# Patient Record
Sex: FEMALE | Race: WHITE | Employment: OTHER | URBAN - METROPOLITAN AREA
[De-identification: names, ages, dates, MRNs, and addresses within clinical notes are randomized per-mention and may not be internally consistent; named-entity substitution may affect disease eponyms.]

---

## 2022-01-10 ENCOUNTER — HOSPITAL ENCOUNTER (OUTPATIENT)
Dept: WOUND CARE | Age: 72
Discharge: HOME | End: 2022-01-10
Payer: MEDICARE

## 2022-01-10 ENCOUNTER — TELEPHONE (OUTPATIENT)
Dept: WOUND CARE | Age: 72
End: 2022-01-10

## 2022-01-10 VITALS
HEART RATE: 90 BPM | RESPIRATION RATE: 18 BRPM | WEIGHT: 210.54 LBS | DIASTOLIC BLOOD PRESSURE: 103 MMHG | HEIGHT: 60 IN | BODY MASS INDEX: 41.33 KG/M2 | SYSTOLIC BLOOD PRESSURE: 154 MMHG | TEMPERATURE: 96.5 F

## 2022-01-10 DIAGNOSIS — T81.89XA NON-HEALING SURGICAL WOUND, INITIAL ENCOUNTER: Primary | ICD-10-CM

## 2022-01-10 PROCEDURE — 87070 CULTURE OTHR SPECIMN AEROBIC: CPT

## 2022-01-10 PROCEDURE — 87186 SC STD MICRODIL/AGAR DIL: CPT

## 2022-01-10 PROCEDURE — 87176 TISSUE HOMOGENIZATION CULTR: CPT

## 2022-01-10 PROCEDURE — 99202 OFFICE O/P NEW SF 15 MIN: CPT | Performed by: SPECIALIST

## 2022-01-10 PROCEDURE — 11042 DBRDMT SUBQ TIS 1ST 20SQCM/<: CPT | Performed by: SPECIALIST

## 2022-01-10 PROCEDURE — 87205 SMEAR GRAM STAIN: CPT

## 2022-01-10 PROCEDURE — 99203 OFFICE O/P NEW LOW 30 MIN: CPT

## 2022-01-10 PROCEDURE — 11042 DBRDMT SUBQ TIS 1ST 20SQCM/<: CPT

## 2022-01-10 PROCEDURE — 87077 CULTURE AEROBIC IDENTIFY: CPT

## 2022-01-10 PROCEDURE — 6370000000 HC RX 637 (ALT 250 FOR IP): Performed by: SPECIALIST

## 2022-01-10 PROCEDURE — 99213 OFFICE O/P EST LOW 20 MIN: CPT

## 2022-01-10 RX ORDER — MOMETASONE FUROATE 1 MG/G
1 OINTMENT TOPICAL PRN
COMMUNITY
Start: 2021-10-08 | End: 2022-10-08

## 2022-01-10 RX ORDER — SODIUM PHOSPHATE,MONO-DIBASIC 19G-7G/118
1 ENEMA (ML) RECTAL DAILY
COMMUNITY

## 2022-01-10 RX ORDER — CEPHALEXIN 500 MG/1
1 TABLET ORAL 4 TIMES DAILY
COMMUNITY
Start: 2022-01-03 | End: 2022-01-17

## 2022-01-10 RX ORDER — SERTRALINE HYDROCHLORIDE 100 MG/1
1 TABLET, FILM COATED ORAL NIGHTLY
COMMUNITY
Start: 2021-12-02

## 2022-01-10 RX ORDER — TRIAMCINOLONE ACETONIDE 5 MG/G
1 CREAM TOPICAL PRN
COMMUNITY
Start: 2021-11-06

## 2022-01-10 RX ORDER — VALACYCLOVIR HYDROCHLORIDE 1 G/1
1000 TABLET, FILM COATED ORAL 3 TIMES DAILY
COMMUNITY
Start: 2022-01-06 | End: 2022-01-17 | Stop reason: ALTCHOICE

## 2022-01-10 RX ORDER — BENZONATATE 100 MG/1
1 CAPSULE ORAL PRN
COMMUNITY
Start: 2022-01-05

## 2022-01-10 RX ORDER — LIDOCAINE HYDROCHLORIDE 40 MG/ML
2.5 SOLUTION TOPICAL ONCE
Status: COMPLETED | OUTPATIENT
Start: 2022-01-10 | End: 2022-01-10

## 2022-01-10 RX ORDER — LOSARTAN POTASSIUM 100 MG/1
1 TABLET ORAL DAILY
COMMUNITY
Start: 2021-12-02

## 2022-01-10 RX ORDER — TOLTERODINE 4 MG/1
1 CAPSULE, EXTENDED RELEASE ORAL DAILY
COMMUNITY
Start: 2021-12-03

## 2022-01-10 RX ORDER — CETIRIZINE HYDROCHLORIDE 10 MG/1
10 TABLET ORAL DAILY
COMMUNITY

## 2022-01-10 RX ORDER — ATORVASTATIN CALCIUM 20 MG/1
20 TABLET, FILM COATED ORAL DAILY
COMMUNITY
Start: 2022-01-05

## 2022-01-10 RX ORDER — ASCORBIC ACID 500 MG
500 TABLET ORAL DAILY
COMMUNITY

## 2022-01-10 RX ORDER — IBUPROFEN 200 MG
1 CAPSULE ORAL DAILY
COMMUNITY

## 2022-01-10 RX ORDER — GABAPENTIN 300 MG/1
1 CAPSULE ORAL EVERY EVENING
COMMUNITY
Start: 2022-01-06

## 2022-01-10 RX ADMIN — LIDOCAINE HYDROCHLORIDE 2.5 ML: 40 SOLUTION TOPICAL at 10:29

## 2022-01-10 ASSESSMENT — PAIN DESCRIPTION - FREQUENCY: FREQUENCY: INTERMITTENT

## 2022-01-10 ASSESSMENT — PAIN DESCRIPTION - PAIN TYPE: TYPE: ACUTE PAIN

## 2022-01-10 ASSESSMENT — PAIN DESCRIPTION - DESCRIPTORS: DESCRIPTORS: DULL

## 2022-01-10 ASSESSMENT — PAIN DESCRIPTION - ONSET: ONSET: ON-GOING

## 2022-01-10 ASSESSMENT — PAIN DESCRIPTION - PROGRESSION: CLINICAL_PROGRESSION: NOT CHANGED

## 2022-01-10 ASSESSMENT — PAIN SCALES - GENERAL: PAINLEVEL_OUTOF10: 2

## 2022-01-10 ASSESSMENT — PAIN - FUNCTIONAL ASSESSMENT: PAIN_FUNCTIONAL_ASSESSMENT: PREVENTS OR INTERFERES SOME ACTIVE ACTIVITIES AND ADLS

## 2022-01-10 ASSESSMENT — PAIN DESCRIPTION - ORIENTATION: ORIENTATION: LEFT

## 2022-01-10 ASSESSMENT — PAIN DESCRIPTION - LOCATION: LOCATION: KNEE

## 2022-01-10 NOTE — PROGRESS NOTES
[facility] [place]  [place] Note and Procedure Note      Krishna Blevins  MEDICAL RECORD NUMBER:  [id number]  AGE: 73 y.o. GENDER: female  : 1950  EPISODE DATE:  1/10/2022      Subjective:     Chief Complaint   Patient presents with    Wound Check     left knee         HISTORY of PRESENT ILLNESS HPI     Krishna Blevins is a 73 y.o. female who presents today for wound evaluation. History of Wound Context: This patient was referred from Dr. Millicent Zheng office for a nonhealing total knee incision having been performed in November of this year. Apparently for some time the patient has noticed portion of the distal wound remained open with occasional drainage. Currently she has been seen several times by the physicians assistant orthopedic office and she has been placed on Keflex with really no improvement in the appearance of the wound. States she is otherwise recuperating from surgery well continues with rehab on her knee. 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 No pertinent family history.     SOCIAL HISTORY    Social History     Tobacco Use    Smoking status: Never Smoker    Smokeless tobacco: Never Used   Vaping Use    Vaping Use: Never used   Substance Use Topics    Alcohol use: Yes     Comment: 1-2 TIMES A YEAR;     Drug use: Never       ALLERGIES    Allergies   Allergen Reactions    Latex Rash     Okay to have flu shot       Nickel      Rash      Sulfa Antibiotics      Rash         MEDICATIONS    Current Outpatient Medications on File Prior to Encounter   Medication Sig Dispense Refill    atorvastatin (LIPITOR) 20 MG tablet Take 20 mg by mouth daily      mometasone (ELOCON) 0.1 % ointment Apply 1 Dose topically as needed      valACYclovir (VALTREX) 1 g tablet Take 1,000 mg by mouth 3 times daily      ascorbic acid (VITAMIN C) 500 MG tablet Take 500 mg by mouth daily 1000 mg      benzonatate (TESSALON) 100 MG capsule Take 1 capsule by mouth as needed      calcium carbonate (OYSTER SHELL CALCIUM 500 MG) 1250 (500 Ca) MG tablet Take 1 tablet by mouth daily      Cephalexin 500 MG TABS Take 1 tablet by mouth 4 times daily      cetirizine (ZYRTEC) 10 MG tablet Take 10 mg by mouth daily      gabapentin (NEURONTIN) 300 MG capsule Take 1 capsule by mouth every evening.  glucosamine-chondroitin 500-400 MG CAPS Take 1 capsule by mouth daily      losartan (COZAAR) 100 MG tablet Take 1 tablet by mouth daily      sertraline (ZOLOFT) 100 MG tablet Take 1 tablet by mouth nightly      tolterodine (DETROL LA) 4 MG extended release capsule Take 1 capsule by mouth daily      triamcinolone (ARISTOCORT) 0.5 % cream Apply 1 Dose topically as needed      MAGNESIUM ASPARTATE PO Take 1 tablet by mouth daily      VITAMIN D, ERGOCALCIFEROL, PO Take 5,000 Units by mouth daily       No current facility-administered medications on file prior to encounter.        REVIEW OF SYSTEMS    Constitutional: negative for chills and fevers  Respiratory: negative for shortness of breath  Cardiovascular: negative for chest pain  Gastrointestinal: negative for abdominal pain  Musculoskeletal:negative except for muscle weakness      Last B9Q if applicable: No results found for: LABA1C    Objective:      BP (!) 154/103   Pulse 90   Temp 96.5 °F (35.8 °C) (Temporal)   Resp 18   Ht 5' (1.524 m)   Wt 210 lb 8.6 oz (95.5 kg)   BMI 41.12 kg/m²     Wt Readings from Last 3 Encounters:   01/10/22 210 lb 8.6 oz (95.5 kg)       PHYSICAL EXAM    General Appearance: in no acute distress and obese  Head: normocephalic and atraumatic  Pulmonary/Chest: clear to auscultation bilaterally- no wheezes, rales or rhonchi, normal air movement, no respiratory distress  Cardiovascular: normal rate, normal S1 and S2, no gallops, intact distal pulses and no carotid bruits  Abdomen: soft, non-tender, non-distended, normal bowel sounds, no masses or organomegaly  Extremities: no cyanosis, clubbing or edema with full-thickness wound distal portion of recent left total knee replacement incision containing fibrin, slough, necrotic fat. Wound does not appear to tract to prosthetic. Mild periwound erythema          Assessment:     No diagnosis found. Procedure Note  Indications:  Based on my examination of this patient's wound(s) today, sharp excision is required to promote healing and evaluate the extent healing. Performed by: Adam Modi MD    Consent obtained: Yes    Time out taken:  Yes    Pain Control: Anesthetic  Anesthetic: 4% Lidocaine Liquid Topical       Debridement:Excisional Debridement    Using curette the wound(s) was/were sharply debrided down through and including the removal of epidermis, dermis and subcutaneous tissue.         Devitalized Tissue Debrided:  fibrin and slough    Pre Debridement Measurements:  Are located in the Wound Documentation Flow Sheet    Wound #: 1     Post  Debridement Measurements:  Wound 01/10/22 Knee #1 LEFT KNEE (Active)   Wound Image   01/10/22 1020   Wound Etiology Non-Healing Surgical 01/10/22 1020   Wound Cleansed Cleansed with saline 01/10/22 1020   Wound Length (cm) 2 cm 01/10/22 1020   Wound Width (cm) 1 cm 01/10/22 1020   Wound Depth (cm) 0.6 cm 01/10/22 1020   Wound Surface Area (cm^2) 2 cm^2 01/10/22 1020   Wound Volume (cm^3) 1.2 cm^3 01/10/22 1020   Post-Procedure Length (cm) 2.1 cm 01/10/22 1037   Post-Procedure Width (cm) 1.1 cm 01/10/22 1037   Post-Procedure Depth (cm) 0.8 cm 01/10/22 1037   Post-Procedure Surface Area (cm^2) 2.31 cm^2 01/10/22 1037   Post-Procedure Volume (cm^3) 1.848 cm^3 01/10/22 1037   Distance Tunneling (cm) 0 cm 01/10/22 1020   Undermining Maxium Distance (cm) 0 01/10/22 1020   Wound Assessment Eschar moist;Slough 01/10/22 1020   Drainage Amount Small 01/10/22 1020   Drainage Description Yellow 01/10/22 1020   Odor None 01/10/22 1020   Roselyn-wound Assessment Intact;Edematous 01/10/22 1020   Margins Defined edges 01/10/22 1020   Wound Thickness Description not for Pressure Injury Full thickness 01/10/22 1020   Number of days: 0          Percent of Wound Debrided: 100%    Total Surface Area Debrided:  2.3 sq cm     Bleeding:  Minimal    Hemostasis Achieved:  by pressure    Procedural Pain:  2  / 10     Post Procedural Pain:  0 / 10     Response to treatment:  With complaints of pain. Plan:   The patient was instructed on dressing changes packing the wound with Medihoney strips. A tissue culture was obtained today from this wound. Treatment Note please see attached Discharge Instructions    New Medication(s) at this visit:   New Prescriptions    No medications on file       Other orders at this visit:   Orders Placed This Encounter   Procedures    Culture, Tissue       Weight Management: No. N/A    Smoking Cessation: Counseling given: Not Answered        Discharge Instructions          [facility] Physician Orders and Discharge Instructions  [address]  Telephone: [phone]  NAME:  Alba Sloan OF BIRTH:  1950  MEDICAL RECORD NUMBER:  [id number]  DATE:  1/10/2022    Wash hands with soap and water prior to and after every dressing change. Wound Cleansing:   · Do not scrub or use excessive force.   · With each dressing change, rinse wounds with 0.9% Saline. (May use wound wash or soft contact solution. Both can be purchased at a local drug store). · If unable to obtain saline, may use a gentle soap and water. · Keep wounds dry in the shower unless otherwise instructed by the physician. · For wounds on lower legs, cast covers can be purchased at local drug stores, so that you may shower and keep the wound(s) dry. []  Vashe Wash solution instructions (if prescribed): Apply enough Vashe to soak a piece of gauze and place on wound bed for 5-10 minutes. DO NOT rinse after Vashe has been applied. Follow dressing application as instructed below. Roselyn wound Topical Treatments:  Do not apply lotions, creams, or ointments to the skin around the wound bed unless directed as followed:     [] Apply around the wound: [] moisturizing lotion [] Antifungal ointment [] No-Sting barrier film [] Zinc paste [] Other:       Dressings:           Wound Location: left knee      Apply Primary Dressing to wound: Other: plain packing strip impregnated with medihoney- pack loosely     Cover and Secure with:     Cover and Secure with: 4X4 gauze pad - paper tape   Avoid contact of tape with skin if possible.  When to change Dressing: [] Daily [] Every Other Day [] Once a week  [x] Three times per week: [x] Monday, Wednesday, Friday [] Tuesday, Thursday, Saturday  [] Do Not Change Dressing [] Other:       Edema Control:      [x] Elevate leg(s) above the level of the heart for 30 minutes 4-5 times a day and/or when sitting. [x] Avoid prolonged standing in one place. Dietary:  Important dietary reminders:  1. Increase Protein intake (i.e. Lean meats, fish, eggs, legumes, and yogurt)  2. No added salt  3. If diabetic, follow a diabetic diet and check glucose prior to meals or as instructed by your physician.     Dietary Supplements:  [] Huber  [] 30ml ProStat  [] EnsureEnlive [] Ensure Max/Premier  [] Other:    If you are still having pain after you go

## 2022-01-10 NOTE — TELEPHONE ENCOUNTER
Stevo [address] [phone] and spoke to [employer]. Requesting medical records on patient. Osco stated letter requesting release of records needs to be faxed. Letter faxed to Parisa at [phone].

## 2022-01-10 NOTE — LETTER
Release of Medical Records  [address] [place] [address]  [address] [place] [address] Road .[phone]  Fax: [phone] [facility] RECORD NUMBER: [id number]   AGE: 73 y.o. GENDER: female : 1950   TODAYS DATE: 1/10/2022       Ms. Raman was seen in the [address] [place] [address] Road on 1/10/2022     Requesting medical records from [facility]. Patient had left knee surgery by Dr. Mirian Rebolledo in 2021. Please fax over records.      Electronically signed by Concepcion Shah RN on 1/10/2022 at 11:59 AM    Sincerely,      Dr. Julita Westbrook and [facility]

## 2022-01-11 ENCOUNTER — TELEPHONE (OUTPATIENT)
Dept: WOUND CARE | Age: 72
End: 2022-01-11

## 2022-01-11 PROBLEM — T81.89XA NONHEALING SURGICAL WOUND: Status: ACTIVE | Noted: 2022-01-11

## 2022-01-11 RX ORDER — LIDOCAINE 50 MG/G
OINTMENT TOPICAL ONCE
Status: CANCELLED | OUTPATIENT
Start: 2022-01-11 | End: 2022-01-11

## 2022-01-11 RX ORDER — LIDOCAINE HYDROCHLORIDE 20 MG/ML
JELLY TOPICAL ONCE
Status: CANCELLED | OUTPATIENT
Start: 2022-01-11 | End: 2022-01-11

## 2022-01-11 RX ORDER — BETAMETHASONE DIPROPIONATE 0.05 %
OINTMENT (GRAM) TOPICAL ONCE
Status: CANCELLED | OUTPATIENT
Start: 2022-01-11 | End: 2022-01-11

## 2022-01-11 RX ORDER — LIDOCAINE HYDROCHLORIDE 40 MG/ML
SOLUTION TOPICAL ONCE
Status: CANCELLED | OUTPATIENT
Start: 2022-01-11 | End: 2022-01-11

## 2022-01-11 RX ORDER — BACITRACIN ZINC AND POLYMYXIN B SULFATE 500; 1000 [USP'U]/G; [USP'U]/G
OINTMENT TOPICAL ONCE
Status: CANCELLED | OUTPATIENT
Start: 2022-01-11 | End: 2022-01-11

## 2022-01-11 RX ORDER — GINSENG 100 MG
CAPSULE ORAL ONCE
Status: CANCELLED | OUTPATIENT
Start: 2022-01-11 | End: 2022-01-11

## 2022-01-11 RX ORDER — BACITRACIN, NEOMYCIN, POLYMYXIN B 400; 3.5; 5 [USP'U]/G; MG/G; [USP'U]/G
OINTMENT TOPICAL ONCE
Status: CANCELLED | OUTPATIENT
Start: 2022-01-11 | End: 2022-01-11

## 2022-01-11 RX ORDER — GENTAMICIN SULFATE 1 MG/G
OINTMENT TOPICAL ONCE
Status: CANCELLED | OUTPATIENT
Start: 2022-01-11 | End: 2022-01-11

## 2022-01-11 RX ORDER — LIDOCAINE 40 MG/G
CREAM TOPICAL ONCE
Status: CANCELLED | OUTPATIENT
Start: 2022-01-11 | End: 2022-01-11

## 2022-01-11 RX ORDER — CLOBETASOL PROPIONATE 0.5 MG/G
OINTMENT TOPICAL ONCE
Status: CANCELLED | OUTPATIENT
Start: 2022-01-11 | End: 2022-01-11

## 2022-01-11 NOTE — TELEPHONE ENCOUNTER
Patient called and stated she has not yet received wound care supplies and questioned if she should be doing her physical therapy for her left knee since she has a wound. Instructed patient to continue physical therapy because of resent surgery to left knee and we will further discuss with wound care visit next week. Will resend fax to [facility]. regarding wound care supplies.

## 2022-01-15 LAB
ANAEROBIC CULTURE: ABNORMAL
GRAM STAIN RESULT: ABNORMAL
ORGANISM: ABNORMAL
WOUND/ABSCESS: ABNORMAL

## 2022-01-17 ENCOUNTER — HOSPITAL ENCOUNTER (OUTPATIENT)
Dept: WOUND CARE | Age: 72
Discharge: HOME | End: 2022-01-17
Payer: MEDICARE

## 2022-01-17 VITALS
RESPIRATION RATE: 20 BRPM | SYSTOLIC BLOOD PRESSURE: 125 MMHG | TEMPERATURE: 96.9 F | DIASTOLIC BLOOD PRESSURE: 84 MMHG | HEART RATE: 69 BPM

## 2022-01-17 DIAGNOSIS — T81.89XA NON-HEALING SURGICAL WOUND, INITIAL ENCOUNTER: Primary | ICD-10-CM

## 2022-01-17 PROCEDURE — 11042 DBRDMT SUBQ TIS 1ST 20SQCM/<: CPT | Performed by: SPECIALIST

## 2022-01-17 PROCEDURE — 6370000000 HC RX 637 (ALT 250 FOR IP): Performed by: SPECIALIST

## 2022-01-17 PROCEDURE — 11042 DBRDMT SUBQ TIS 1ST 20SQCM/<: CPT

## 2022-01-17 RX ORDER — LIDOCAINE 50 MG/G
OINTMENT TOPICAL ONCE
Status: CANCELLED | OUTPATIENT
Start: 2022-01-17 | End: 2022-01-17

## 2022-01-17 RX ORDER — LIDOCAINE 40 MG/G
CREAM TOPICAL ONCE
Status: CANCELLED | OUTPATIENT
Start: 2022-01-17 | End: 2022-01-17

## 2022-01-17 RX ORDER — CIPROFLOXACIN 500 MG/1
500 TABLET, FILM COATED ORAL 2 TIMES DAILY
Qty: 20 TABLET | Refills: 0 | Status: SHIPPED | OUTPATIENT
Start: 2022-01-17 | End: 2022-01-27

## 2022-01-17 RX ORDER — LIDOCAINE HYDROCHLORIDE 40 MG/ML
SOLUTION TOPICAL ONCE
Status: CANCELLED | OUTPATIENT
Start: 2022-01-17 | End: 2022-01-17

## 2022-01-17 RX ORDER — LIDOCAINE HYDROCHLORIDE 20 MG/ML
JELLY TOPICAL ONCE
Status: CANCELLED | OUTPATIENT
Start: 2022-01-17 | End: 2022-01-17

## 2022-01-17 RX ORDER — LIDOCAINE HYDROCHLORIDE 40 MG/ML
SOLUTION TOPICAL ONCE
Status: COMPLETED | OUTPATIENT
Start: 2022-01-17 | End: 2022-01-17

## 2022-01-17 RX ORDER — CLOBETASOL PROPIONATE 0.5 MG/G
OINTMENT TOPICAL ONCE
Status: CANCELLED | OUTPATIENT
Start: 2022-01-17 | End: 2022-01-17

## 2022-01-17 RX ORDER — BACITRACIN ZINC AND POLYMYXIN B SULFATE 500; 1000 [USP'U]/G; [USP'U]/G
OINTMENT TOPICAL ONCE
Status: CANCELLED | OUTPATIENT
Start: 2022-01-17 | End: 2022-01-17

## 2022-01-17 RX ORDER — CIPROFLOXACIN 500 MG/1
500 TABLET, FILM COATED ORAL 2 TIMES DAILY
COMMUNITY
End: 2022-01-24

## 2022-01-17 RX ORDER — BETAMETHASONE DIPROPIONATE 0.05 %
OINTMENT (GRAM) TOPICAL ONCE
Status: CANCELLED | OUTPATIENT
Start: 2022-01-17 | End: 2022-01-17

## 2022-01-17 RX ORDER — BACITRACIN, NEOMYCIN, POLYMYXIN B 400; 3.5; 5 [USP'U]/G; MG/G; [USP'U]/G
OINTMENT TOPICAL ONCE
Status: CANCELLED | OUTPATIENT
Start: 2022-01-17 | End: 2022-01-17

## 2022-01-17 RX ORDER — GENTAMICIN SULFATE 1 MG/G
OINTMENT TOPICAL ONCE
Status: CANCELLED | OUTPATIENT
Start: 2022-01-17 | End: 2022-01-17

## 2022-01-17 RX ORDER — GINSENG 100 MG
CAPSULE ORAL ONCE
Status: CANCELLED | OUTPATIENT
Start: 2022-01-17 | End: 2022-01-17

## 2022-01-17 RX ADMIN — LIDOCAINE HYDROCHLORIDE: 40 SOLUTION TOPICAL at 10:14

## 2022-01-17 ASSESSMENT — PAIN DESCRIPTION - PAIN TYPE: TYPE: ACUTE PAIN

## 2022-01-17 ASSESSMENT — PAIN DESCRIPTION - DESCRIPTORS: DESCRIPTORS: OTHER (COMMENT)

## 2022-01-17 ASSESSMENT — PAIN DESCRIPTION - ORIENTATION: ORIENTATION: LEFT

## 2022-01-17 ASSESSMENT — PAIN DESCRIPTION - LOCATION: LOCATION: KNEE

## 2022-01-17 ASSESSMENT — PAIN DESCRIPTION - ONSET: ONSET: ON-GOING

## 2022-01-17 ASSESSMENT — PAIN DESCRIPTION - FREQUENCY: FREQUENCY: INTERMITTENT

## 2022-01-17 ASSESSMENT — PAIN SCALES - GENERAL: PAINLEVEL_OUTOF10: 5

## 2022-01-17 NOTE — PROGRESS NOTES
Yes    Time out taken: Yes    Pain Control: Anesthetic: 4% Lidocaine Liquid Topical     Debridement:Excisional Debridement    Using curette the wound was sharply debrided    down through and including the removal of  epidermis, dermis and subcutaneous tissue.     Devitalized Tissue Debrided:  fibrin and slough      Pre Debridement Measurements:  Are located in the Wound Documentation Flow Sheet   Wound #: 1     Post  Debridement Measurements:  Wound 01/10/22 Knee #1 LEFT KNEE (Active)   Wound Image   01/10/22 1020   Wound Etiology Non-Healing Surgical 01/10/22 1020   Wound Cleansed Cleansed with saline 01/17/22 1003   Dressing/Treatment Other (comment) 01/17/22 1028   Wound Length (cm) 2 cm 01/17/22 1003   Wound Width (cm) 1 cm 01/17/22 1003   Wound Depth (cm) 0.6 cm 01/17/22 1003   Wound Surface Area (cm^2) 2 cm^2 01/17/22 1003   Change in Wound Size % (l*w) 0 01/17/22 1003   Wound Volume (cm^3) 1.2 cm^3 01/17/22 1003   Wound Healing % 0 01/17/22 1003   Post-Procedure Length (cm) 2.1 cm 01/17/22 1028   Post-Procedure Width (cm) 1.1 cm 01/17/22 1028   Post-Procedure Depth (cm) 0.8 cm 01/17/22 1028   Post-Procedure Surface Area (cm^2) 2.31 cm^2 01/17/22 1028   Post-Procedure Volume (cm^3) 1.848 cm^3 01/17/22 1028   Distance Tunneling (cm) 0 cm 01/17/22 1003   Undermining Starts ___ O'Clock 11:00 01/17/22 1003   Undermining Ends___ O'Clock 1:00 01/17/22 1003   Undermining Maxium Distance (cm) 0.2 01/17/22 1003   Wound Assessment Slough 01/17/22 1003   Drainage Amount Small 01/17/22 1003   Drainage Description Brown 01/17/22 1003   Odor None 01/17/22 1003   Roselyn-wound Assessment Intact;Edematous 01/17/22 1003   Margins Defined edges 01/17/22 1003   Wound Thickness Description not for Pressure Injury Full thickness 01/17/22 1003   Number of days: 7          Percent of Wound Debrided: 100%    Total Surface Area Debrided:  2.3 sq cm    Diabetic/Pressure/Non Pressure Ulcers only:  Ulcer: Non-Pressure ulcer, fat layer exposed    Bleeding: Minimal    Hemostasis Achieved: by pressure    Procedural Pain: 0  / 10     Post Procedural Pain: 0 / 10     Response to treatment:  Well tolerated by patient. I have asked patient to discontinue Keflex antibiotics as prescribed by Dr. Hailee PRYOR. Tissue culture done last week shows more efficacy with putting patient on a course of Cipro oral antibiotics. Plan:     Treatment Note: Please see attached Discharge Instructions. These instructions were given and signed by the patient or POA    New Medication(s) at this visit:   New Prescriptions    CIPROFLOXACIN (CIPRO) 500 MG TABLET    Take 1 tablet by mouth 2 times daily for 10 days       Other orders at this visit:   Orders Placed This Encounter   Procedures   [id number] [facility] Protocol       Discharge Instructions          [facility] Physician Orders and Discharge Instructions  [address] [place] [address] [address] [place] [address]. [place] [address]  Telephone: [phone]  NAME:  Navjot Aparicio OF BIRTH:  1950  MEDICAL RECORD NUMBER:  [id number]  DATE:  1/17/2022    Wash hands with soap and water prior to and after every dressing change. Wound Cleansing:   · Do not scrub or use excessive force. · With each dressing change, rinse wounds with 0.9% Saline. (May use wound wash or soft contact solution. Both can be purchased at a local drug store). · If unable to obtain saline, may use a gentle soap and water. · Keep wounds dry in the shower unless otherwise instructed by the physician. · For wounds on lower legs, cast covers can be purchased at local drug stores, so that you may shower and keep the wound(s) dry. []  Vashe Wash solution instructions (if prescribed): Apply enough Vashe to soak a piece of gauze and place on wound bed for 5-10 minutes. DO NOT rinse after Vashe has been applied. Follow dressing application as instructed below.     Roselyn wound Topical Treatments:  Do not apply lotions, creams, or ointments to the skin around the wound bed unless directed as followed:     [] Apply around the wound: [] moisturizing lotion [] Antifungal ointment [] No-Sting barrier film [] Zinc paste [] Other:       Dressings:           Wound Location: left knee      Apply Primary Dressing to wound:       Plain packing  coated with medihoney- pack loosely     Cover and Secure with:     Cover and Secure with: 4X4 gauze pad  Bulky roll gauze   Avoid contact of tape with skin if possible.  When to change Dressing: [] Daily [] Every Other Day [] Once a week  [x] Three times per week: [x] Monday, Wednesday, Friday [] Tuesday, Thursday, Saturday  [] Do Not Change Dressing [] Other:       Edema Control:  Apply: [] Compression Stocking  [] Left Lower Leg [] Right Lower Leg     [x]  Spandagrip:Strength: Low compression 5-10 mm/Hg - up to thigh (use size F)   [x] Left Lower Leg  [] Right Lower Leg        Apply every morning immediately when getting up. They should be applied to affected leg(s) from mid foot to knee making sure to cover the heel. Remove every night before going to bed. [x] Elevate leg(s) above the level of the heart for 30 minutes 4-5 times a day and/or when sitting. [x] Avoid prolonged standing in one place. Dietary:  Important dietary reminders:  1. Increase Protein intake (i.e. Lean meats, fish, eggs, legumes, and yogurt)  2. No added salt  3. If diabetic, follow a diabetic diet and check glucose prior to meals or as instructed by your physician. Dietary Supplements:  [] Huber  [] 30ml ProStat  [] EnsureEnlive [] Ensure Max/Premier  [] Other:    If you are still having pain after you go home:   For wounds on lower legs or arms, elevate the affected limb.  Use over-the-counter medications you would normally use for pain as permitted by your primary care doctor.    For persistent pain not relieved by the above interventions, please call your primary care doctor. Return Appointment:   Return Appointment: With Dr. Trevor Bustillo  in  [facility])    [] Return Appointment for a Wound Assessment (Nurse Visit) on:       [x] Orders placed during your visit:   Orders Placed This Encounter   Procedures    Initiate Outpatient Wound Care Protocol       o All other future appointments:  - No future appointments. Your nurse  is:  Per Ruiz     Electronically signed by Nick Barrow RN on 1/17/2022 at 10:30 R Rocco Ocampo 8 Information: Should you experience any significant changes in your wound(s) or have questions about your wound care, please contact the [address] [place] [address] at [phone]. We are open from 8:00am - 4:30p Monday thru Friday except for Wednesdays which we are closed. Please give us 24-48 hours to return your call. Call your doctor now or seek immediate medical care if:    · You have symptoms of infection, such as:  ? Increased pain, swelling, warmth, or redness. ? Red streaks leading from the area. ? Pus draining from the area. ? A fever.         Physician for this visit and orders: Jourdan Brooks MD    [] Patient unable to sign Discharge Instructions given to ECF/Transportation/POA        Electronically signed by Jourdan Brooks MD on 1/17/2022 at 10:49 AM

## 2022-01-24 ENCOUNTER — HOSPITAL ENCOUNTER (OUTPATIENT)
Dept: WOUND CARE | Age: 72
Discharge: HOME | End: 2022-01-24
Payer: MEDICARE

## 2022-01-24 VITALS
SYSTOLIC BLOOD PRESSURE: 130 MMHG | HEART RATE: 96 BPM | TEMPERATURE: 97 F | RESPIRATION RATE: 20 BRPM | DIASTOLIC BLOOD PRESSURE: 78 MMHG

## 2022-01-24 DIAGNOSIS — T81.89XD NON-HEALING SURGICAL WOUND, SUBSEQUENT ENCOUNTER: Primary | ICD-10-CM

## 2022-01-24 DIAGNOSIS — T81.89XA NON-HEALING SURGICAL WOUND, INITIAL ENCOUNTER: ICD-10-CM

## 2022-01-24 PROCEDURE — 11042 DBRDMT SUBQ TIS 1ST 20SQCM/<: CPT | Performed by: SPECIALIST

## 2022-01-24 PROCEDURE — 11042 DBRDMT SUBQ TIS 1ST 20SQCM/<: CPT

## 2022-01-24 PROCEDURE — 6370000000 HC RX 637 (ALT 250 FOR IP): Performed by: SPECIALIST

## 2022-01-24 PROCEDURE — 97607 NEG PRS WND THR NDME<=50SQCM: CPT

## 2022-01-24 RX ORDER — LIDOCAINE 40 MG/G
CREAM TOPICAL ONCE
Status: CANCELLED | OUTPATIENT
Start: 2022-01-24 | End: 2022-01-24

## 2022-01-24 RX ORDER — BACITRACIN, NEOMYCIN, POLYMYXIN B 400; 3.5; 5 [USP'U]/G; MG/G; [USP'U]/G
OINTMENT TOPICAL ONCE
Status: CANCELLED | OUTPATIENT
Start: 2022-01-24 | End: 2022-01-24

## 2022-01-24 RX ORDER — LIDOCAINE 50 MG/G
OINTMENT TOPICAL ONCE
Status: CANCELLED | OUTPATIENT
Start: 2022-01-24 | End: 2022-01-24

## 2022-01-24 RX ORDER — GINSENG 100 MG
CAPSULE ORAL ONCE
Status: CANCELLED | OUTPATIENT
Start: 2022-01-24 | End: 2022-01-24

## 2022-01-24 RX ORDER — GENTAMICIN SULFATE 1 MG/G
OINTMENT TOPICAL ONCE
Status: CANCELLED | OUTPATIENT
Start: 2022-01-24 | End: 2022-01-24

## 2022-01-24 RX ORDER — LIDOCAINE HYDROCHLORIDE 20 MG/ML
JELLY TOPICAL ONCE
Status: CANCELLED | OUTPATIENT
Start: 2022-01-24 | End: 2022-01-24

## 2022-01-24 RX ORDER — LIDOCAINE HYDROCHLORIDE 40 MG/ML
SOLUTION TOPICAL ONCE
Status: CANCELLED | OUTPATIENT
Start: 2022-01-24 | End: 2022-01-24

## 2022-01-24 RX ORDER — BACITRACIN ZINC AND POLYMYXIN B SULFATE 500; 1000 [USP'U]/G; [USP'U]/G
OINTMENT TOPICAL ONCE
Status: CANCELLED | OUTPATIENT
Start: 2022-01-24 | End: 2022-01-24

## 2022-01-24 RX ORDER — BETAMETHASONE DIPROPIONATE 0.05 %
OINTMENT (GRAM) TOPICAL ONCE
Status: CANCELLED | OUTPATIENT
Start: 2022-01-24 | End: 2022-01-24

## 2022-01-24 RX ORDER — CLOBETASOL PROPIONATE 0.5 MG/G
OINTMENT TOPICAL ONCE
Status: CANCELLED | OUTPATIENT
Start: 2022-01-24 | End: 2022-01-24

## 2022-01-24 RX ORDER — LIDOCAINE HYDROCHLORIDE 40 MG/ML
SOLUTION TOPICAL ONCE
Status: COMPLETED | OUTPATIENT
Start: 2022-01-24 | End: 2022-01-24

## 2022-01-24 RX ADMIN — LIDOCAINE HYDROCHLORIDE: 40 SOLUTION TOPICAL at 10:08

## 2022-01-24 ASSESSMENT — PAIN SCALES - GENERAL: PAINLEVEL_OUTOF10: 3

## 2022-01-24 ASSESSMENT — PAIN DESCRIPTION - ORIENTATION: ORIENTATION: LEFT

## 2022-01-24 ASSESSMENT — PAIN DESCRIPTION - LOCATION: LOCATION: KNEE

## 2022-01-24 ASSESSMENT — PAIN DESCRIPTION - PROGRESSION: CLINICAL_PROGRESSION: NOT CHANGED

## 2022-01-24 ASSESSMENT — PAIN DESCRIPTION - PAIN TYPE: TYPE: ACUTE PAIN

## 2022-01-24 ASSESSMENT — PAIN DESCRIPTION - DESCRIPTORS: DESCRIPTORS: BURNING

## 2022-01-24 ASSESSMENT — PAIN DESCRIPTION - FREQUENCY: FREQUENCY: INTERMITTENT

## 2022-01-24 ASSESSMENT — PAIN DESCRIPTION - ONSET: ONSET: ON-GOING

## 2022-01-24 NOTE — PROGRESS NOTES
SNap Vac Dressing Application    NAME:  Guero Perez  YOB: 1950  MEDICAL RECORD NUMBER:  [id number]  DATE:  1/24/2022    1. Rinsed wound with saline  2. Cut foam to fit  3. Applied ring of hydrocolloid around wound approx 1\" from edge of wound  4. Cut hydrocolloid dressing kit to accommodate wound  5. Attach dressing kit over wound  6. Cut tubing to length for proper fitting for patient  7. Cannister primed   8.  Patient instructed on care of cannister and how to re-prime if neccessary

## 2022-01-24 NOTE — PROGRESS NOTES
[facility]  Progress Note and Procedure Note      Goldie Severance  MEDICAL RECORD NUMBER:  [id number]  AGE: 73 y.o. GENDER: female  : 1950  EPISODE DATE:  2022    Subjective:     Chief Complaint   Patient presents with    Wound Check     left knee         HISTORY of PRESENT ILLNESS HPI     Goldie Severance is a 73 y.o. female who presents today for wound/ulcer evaluation. History of Wound Context: This patient was referred from Dr. Yesy Brantley office for a nonhealing total knee incision having been performed in November of this year.  Apparently for some time the patient has noticed portion of the distal wound remained open with occasional drainage.  Currently she has been seen several times by the physicians assistant orthopedic office and she d been placed on Keflex with really no improvement in the appearance of the wound.  States she is otherwise recuperating from surgery well continues with rehab on her knee. Patient is now on appropriate antibiotics for recent tissue culture done here in wound care. Also patient recently seen by Dr. Caroline Rivera postop evaluation. Pain Assessment:  Wound/Ulcer Pain Timing/Severity: none  Quality of pain: N/A  Severity:  0 / 10   Modifying Factors: None  Associated Signs/Symptoms: none    Ulcer Identification:  Ulcer Type: non-healing surgical  Contributing Factors: obesity    Objective:      /78   Pulse 96   Temp 97 °F (36.1 °C) (Temporal)   Resp 20     Wt Readings from Last 3 Encounters:   01/10/22 210 lb 8.6 oz (95.5 kg)       PHYSICAL EXAM    Extremities: Full-thickness wound overlying the distal portion of her recent left total knee replacement attaining fibrin and slough necrotic fat. Wound does not track to prosthesis. There is no drainage    Assessment:      1. Non-healing surgical wound, subsequent encounter    2.  Non-healing surgical wound, initial encounter         Procedure Note  Indications:  Based on my examination of this patient's wound(s) today, sharp excision is required to promote healing and evaluate the extent healing. Performed by: Lesia Lopez MD    Consent obtained? Yes    Time out taken: Yes    Pain Control: Anesthetic: 4% Lidocaine Liquid Topical     Debridement:Excisional Debridement    Using curette the wound was sharply debrided    down through and including the removal of  epidermis, dermis and subcutaneous tissue. Devitalized Tissue Debrided:  fibrin and slough      Pre Debridement Measurements:  Are located in the Wound Documentation Flow Sheet   Wound #: 1     Post  Debridement Measurements:  Negative Pressure Wound Therapy Knee Anterior; Left (Active)   Wound Type Surgical 01/24/22 1022   Unit Type snap 01/24/22 1022   Number of pieces used 1 01/24/22 1022   Cycle Continuous 01/24/22 1022   Target Pressure (mmHg) 125 01/24/22 1022   Number of days: 0       Wound 01/10/22 Knee #1 LEFT KNEE (Active)   Wound Image   01/10/22 1020   Wound Etiology Non-Healing Surgical 01/10/22 1020   Wound Cleansed Cleansed with saline 01/24/22 0957   Dressing/Treatment Negative pressure wound therapy 01/24/22 1022   Wound Length (cm) 1.9 cm 01/24/22 0957   Wound Width (cm) 1.1 cm 01/24/22 0957   Wound Depth (cm) 0.6 cm 01/24/22 0957   Wound Surface Area (cm^2) 2.09 cm^2 01/24/22 0957   Change in Wound Size % (l*w) -4.5 01/24/22 0957   Wound Volume (cm^3) 1. 254 cm^3 01/24/22 0957   Wound Healing % -4 01/24/22 0957   Post-Procedure Length (cm) 2 cm 01/24/22 1022   Post-Procedure Width (cm) 1.2 cm 01/24/22 1022   Post-Procedure Depth (cm) 0.8 cm 01/24/22 1022   Post-Procedure Surface Area (cm^2) 2.4 cm^2 01/24/22 1022   Post-Procedure Volume (cm^3) 1.92 cm^3 01/24/22 1022   Distance Tunneling (cm) 0 cm 01/17/22 1003   Undermining Starts ___ O'Clock 10:00 01/24/22 0957   Undermining Ends___ O'Clock 4:00 01/24/22 0957   Undermining Maxium Distance (cm) 0.2 01/24/22 0957   Wound Assessment Pink/red;Slough 01/24/22 0957 Drainage Amount Small 01/24/22 0957   Drainage Description Lorne Hamman 01/24/22 0957   Odor None 01/24/22 0957   Roselyn-wound Assessment Intact 01/24/22 0957   Margins Defined edges 01/24/22 0957   Wound Thickness Description not for Pressure Injury Full thickness 01/24/22 0957   Number of days: 14          Percent of Wound Debrided: 100%    Total Surface Area Debrided:  2.4 sq cm    Diabetic/Pressure/Non Pressure Ulcers only:  Ulcer: Non-Pressure ulcer, fat layer exposed    Bleeding: Minimal    Hemostasis Achieved: by pressure    Procedural Pain: 0  / 10     Post Procedural Pain: 0 / 10     Response to treatment:  Well tolerated by patient. Due to the unchanged depth of the wound I have spoken to Dr. Violet Mathias about placing a mechanical snap VAC over the wound to assist healing. This was applied today without difficulty        Plan:     Treatment Note: Please see attached Discharge Instructions. These instructions were given and signed by the patient or POA    New Medication(s) at this visit:   New Prescriptions    No medications on file       Other orders at this visit:   Orders Placed This Encounter   Procedures   [id number] [address] [facility]eno Protocol       Discharge Instructions          [facility] Physician Orders and Discharge Instructions  [address] [address] [address] [place] [address]. [facility]. [facility]  Telephone: [phone]  NAME:  Tiffany Lantigua OF BIRTH:  1950  MEDICAL RECORD NUMBER:  [id number]  DATE:  1/24/2022    Wash hands with soap and water prior to and after every dressing change. Wound Cleansing:   · Do not scrub or use excessive force. · With each dressing change, rinse wounds with 0.9% Saline. (May use wound wash or soft contact solution. Both can be purchased at a local drug store). · If unable to obtain saline, may use a gentle soap and water. · Keep wounds dry in the shower unless otherwise instructed by the physician.   · For wounds on lower legs, cast covers can be purchased at local drug stores, so that you may shower and keep the wound(s) dry. []  Vashe Wash solution instructions (if prescribed): Apply enough Vashe to soak a piece of gauze and place on wound bed for 5-10 minutes. DO NOT rinse after Vashe has been applied. Follow dressing application as instructed below. Roselyn wound Topical Treatments:  Do not apply lotions, creams, or ointments to the skin around the wound bed unless directed as followed:     [] Apply around the wound: [] moisturizing lotion [] Antifungal ointment [] No-Sting barrier film [] Zinc paste [] Other:      Negative Pressure:           Wound Location: left knee- snap vac  [x] Pressure @125mmHg  continuous using blue foam.    []White foam to be used on:     []Change dressing: []Monday, Wednesday Friday  []Tuesday, Thursday, Saturday  [x] Other: patient will call if issues with snap vac   Make sure that tubing is not against the skin by using gauze and kerlix. Dietary:  Important dietary reminders:  1. Increase Protein intake (i.e. Lean meats, fish, eggs, legumes, and yogurt)  2. No added salt  3. If diabetic, follow a diabetic diet and check glucose prior to meals or as instructed by your physician. Dietary Supplements:  [] Huber  [] 30ml ProStat  [] EnsureEnlive [] Ensure Max/Premier  [] Other:    If you are still having pain after you go home:   For wounds on lower legs or arms, elevate the affected limb.  Use over-the-counter medications you would normally use for pain as permitted by your primary care doctor.  For persistent pain not relieved by the above interventions, please call your primary care doctor.      Return Appointment:   Return Appointment: With Dr. Corbin Gabriel  in  [facility])    [] Return Appointment for a Wound Assessment (Nurse Visit) on:       [x] Orders placed during your visit:   Orders Placed This Encounter   Procedures    Initiate Outpatient Wound Care Protocol       o All other future appointments:  - No future appointments. Your nurse  is:  Stevenson Burt     Electronically signed by Sourav Silveira RN on 1/24/2022 at 10:24 AM     [address] [facility] [address] Information: Should you experience any significant changes in your wound(s) or have questions about your wound care, please contact the [address] [place] [address] at [phone]. We are open from 8:00am - 4:30p Monday thru Friday except for Wednesdays which we are closed. Please give us 24-48 hours to return your call. Call your doctor now or seek immediate medical care if:    · You have symptoms of infection, such as:  ? Increased pain, swelling, warmth, or redness. ? Red streaks leading from the area. ? Pus draining from the area. ? A fever.         Physician for this visit and orders: Yasmeen Washington MD    [] Patient unable to sign Discharge Instructions given to ECF/Transportation/POA        Electronically signed by Yasmeen Washington MD on 1/24/2022 at 10:49 AM

## 2022-01-28 ENCOUNTER — TELEPHONE (OUTPATIENT)
Dept: WOUND CARE | Age: 72
End: 2022-01-28

## 2022-01-31 ENCOUNTER — HOSPITAL ENCOUNTER (OUTPATIENT)
Dept: WOUND CARE | Age: 72
Discharge: HOME | End: 2022-01-31
Payer: MEDICARE

## 2022-01-31 VITALS
SYSTOLIC BLOOD PRESSURE: 135 MMHG | DIASTOLIC BLOOD PRESSURE: 83 MMHG | HEART RATE: 100 BPM | RESPIRATION RATE: 20 BRPM | TEMPERATURE: 97.8 F

## 2022-01-31 DIAGNOSIS — T81.89XA NON-HEALING SURGICAL WOUND, INITIAL ENCOUNTER: Primary | ICD-10-CM

## 2022-01-31 PROCEDURE — 97607 NEG PRS WND THR NDME<=50SQCM: CPT

## 2022-01-31 PROCEDURE — 6370000000 HC RX 637 (ALT 250 FOR IP): Performed by: SPECIALIST

## 2022-01-31 PROCEDURE — 11042 DBRDMT SUBQ TIS 1ST 20SQCM/<: CPT

## 2022-01-31 PROCEDURE — 11042 DBRDMT SUBQ TIS 1ST 20SQCM/<: CPT | Performed by: SPECIALIST

## 2022-01-31 RX ORDER — LIDOCAINE 40 MG/G
CREAM TOPICAL ONCE
Status: CANCELLED | OUTPATIENT
Start: 2022-01-31 | End: 2022-01-31

## 2022-01-31 RX ORDER — LIDOCAINE HYDROCHLORIDE 40 MG/ML
SOLUTION TOPICAL ONCE
Status: CANCELLED | OUTPATIENT
Start: 2022-01-31 | End: 2022-01-31

## 2022-01-31 RX ORDER — LIDOCAINE HYDROCHLORIDE 40 MG/ML
SOLUTION TOPICAL ONCE
Status: COMPLETED | OUTPATIENT
Start: 2022-01-31 | End: 2022-01-31

## 2022-01-31 RX ORDER — GENTAMICIN SULFATE 1 MG/G
OINTMENT TOPICAL ONCE
Status: CANCELLED | OUTPATIENT
Start: 2022-01-31 | End: 2022-01-31

## 2022-01-31 RX ORDER — BACITRACIN, NEOMYCIN, POLYMYXIN B 400; 3.5; 5 [USP'U]/G; MG/G; [USP'U]/G
OINTMENT TOPICAL ONCE
Status: CANCELLED | OUTPATIENT
Start: 2022-01-31 | End: 2022-01-31

## 2022-01-31 RX ORDER — CLOBETASOL PROPIONATE 0.5 MG/G
OINTMENT TOPICAL ONCE
Status: CANCELLED | OUTPATIENT
Start: 2022-01-31 | End: 2022-01-31

## 2022-01-31 RX ORDER — BACITRACIN ZINC AND POLYMYXIN B SULFATE 500; 1000 [USP'U]/G; [USP'U]/G
OINTMENT TOPICAL ONCE
Status: CANCELLED | OUTPATIENT
Start: 2022-01-31 | End: 2022-01-31

## 2022-01-31 RX ORDER — LIDOCAINE 50 MG/G
OINTMENT TOPICAL ONCE
Status: CANCELLED | OUTPATIENT
Start: 2022-01-31 | End: 2022-01-31

## 2022-01-31 RX ORDER — LIDOCAINE HYDROCHLORIDE 20 MG/ML
JELLY TOPICAL ONCE
Status: CANCELLED | OUTPATIENT
Start: 2022-01-31 | End: 2022-01-31

## 2022-01-31 RX ORDER — GINSENG 100 MG
CAPSULE ORAL ONCE
Status: CANCELLED | OUTPATIENT
Start: 2022-01-31 | End: 2022-01-31

## 2022-01-31 RX ORDER — BETAMETHASONE DIPROPIONATE 0.05 %
OINTMENT (GRAM) TOPICAL ONCE
Status: CANCELLED | OUTPATIENT
Start: 2022-01-31 | End: 2022-01-31

## 2022-01-31 RX ADMIN — LIDOCAINE HYDROCHLORIDE: 40 SOLUTION TOPICAL at 10:13

## 2022-01-31 ASSESSMENT — PAIN DESCRIPTION - PROGRESSION: CLINICAL_PROGRESSION: NOT CHANGED

## 2022-01-31 ASSESSMENT — PAIN DESCRIPTION - PAIN TYPE: TYPE: ACUTE PAIN

## 2022-01-31 ASSESSMENT — PAIN DESCRIPTION - ONSET: ONSET: ON-GOING

## 2022-01-31 ASSESSMENT — PAIN DESCRIPTION - ORIENTATION: ORIENTATION: LEFT

## 2022-01-31 ASSESSMENT — PAIN DESCRIPTION - LOCATION: LOCATION: KNEE

## 2022-01-31 ASSESSMENT — PAIN DESCRIPTION - FREQUENCY: FREQUENCY: INTERMITTENT

## 2022-01-31 ASSESSMENT — PAIN DESCRIPTION - DESCRIPTORS: DESCRIPTORS: THROBBING

## 2022-01-31 ASSESSMENT — PAIN SCALES - GENERAL: PAINLEVEL_OUTOF10: 5

## 2022-01-31 ASSESSMENT — PAIN - FUNCTIONAL ASSESSMENT: PAIN_FUNCTIONAL_ASSESSMENT: PREVENTS OR INTERFERES SOME ACTIVE ACTIVITIES AND ADLS

## 2022-01-31 NOTE — PROGRESS NOTES
1227 [facility]  Progress Note and Procedure Note      Jose Alejandro De Leon  MEDICAL RECORD NUMBER:  [id number]  AGE: 73 y.o. GENDER: female  : 1950  EPISODE DATE:  2022    Subjective:     Chief Complaint   Patient presents with    Wound Check         HISTORY of PRESENT ILLNESS HPI     JoseA lejandro De Leon is a 73 y.o. female who presents today for wound/ulcer evaluation. History of Wound Context: This patient was referred from Dr. Lovell Bennie office for a nonhealing total knee incision having been performed in November of this year.  Apparently for some time the patient has noticed portion of the distal wound remained open with occasional drainage.  Currently she has been seen several times by the physicians assistant orthopedic office and she d been placed on Keflex with really no improvement in the appearance of the wound.  States she is otherwise recuperating from surgery well continues with rehab on her knee. Patient is now on appropriate antibiotics for recent tissue culture done here in wound care. Also patient recently seen by Dr. nOeyda Durham postop evaluation. She has tolerated wearing the snap mechanical VAC this past week without difficulty    Pain Assessment:  Wound/Ulcer Pain Timing/Severity: none  Quality of pain: N/A  Severity:  0 / 10   Modifying Factors: None  Associated Signs/Symptoms: none    Ulcer Identification:  Ulcer Type: non-healing surgical  Contributing Factors: obesity    Objective:      /83   Pulse 100   Temp 97.8 °F (36.6 °C)   Resp 20     Wt Readings from Last 3 Encounters:   01/10/22 210 lb 8.6 oz (95.5 kg)       PHYSICAL EXAM    Extremities: Full thickness wound containing fibrin slough and retained suture material distal portion of her recent total knee incision    Assessment:      1.  Non-healing surgical wound, initial encounter         Procedure Note  Indications:  Based on my examination of this patient's wound(s) today, sharp excision is required to Serosanguinous 01/31/22 1003   Odor None 01/31/22 1003   Roselyn-wound Assessment Intact 01/31/22 1003   Margins Defined edges 01/31/22 1003   Wound Thickness Description not for Pressure Injury Full thickness 01/31/22 1003   Number of days: 21          Percent of Wound Debrided: 100%    Total Surface Area Debrided:  .84 sq cm    Diabetic/Pressure/Non Pressure Ulcers only:  Ulcer: Non-Pressure ulcer, fat layer exposed    Bleeding: Minimal    Hemostasis Achieved: by pressure    Procedural Pain: 0  / 10     Post Procedural Pain: 0 / 10     Response to treatment:  Well tolerated by patient. Wound measurements slightly improved after having worn the VAC for 1 week        Plan:     Treatment Note: Please see attached Discharge Instructions. These instructions were given and signed by the patient or POA    New Medication(s) at this visit:   New Prescriptions    No medications on file       Other orders at this visit:   Orders Placed This Encounter   Procedures   [id number] [address]eno Protocol       Discharge Instructions          215 [facility] Physician Orders and Discharge Instructions  [address] [address] [address] [place] [address]. [place] [address]  Telephone: [phone]  NAME:  Alba Sloan OF BIRTH:  1950  MEDICAL RECORD NUMBER:  [id number]  DATE:  1/31/2022    Wash hands with soap and water prior to and after every dressing change. Wound Cleansing:   · Do not scrub or use excessive force. · With each dressing change, rinse wounds with 0.9% Saline. (May use wound wash or soft contact solution. Both can be purchased at a local drug store). · If unable to obtain saline, may use a gentle soap and water. · Keep wounds dry in the shower unless otherwise instructed by the physician. · For wounds on lower legs, cast covers can be purchased at local drug stores, so that you may shower and keep the wound(s) dry.     []  Vashe Wash solution instructions (if prescribed): Apply enough Vashe to soak a piece of gauze and place on wound bed for 5-10 minutes. DO NOT rinse after Vashe has been applied. Follow dressing application as instructed below. Roselyn wound Topical Treatments:  Do not apply lotions, creams, or ointments to the skin around the wound bed unless directed as followed:     [] Apply around the wound: [] moisturizing lotion [] Antifungal ointment [] No-Sting barrier film [] Zinc paste [] Other:      Negative Pressure:           Wound Location: left knee wound - snap vac  [x] Pressure @125mmHg  continuous using blue foam.    []White foam to be used on:     []Change dressing: []Monday, Wednesday Friday  []Tuesday, Thursday, Saturday  [x] Other: do not change   Make sure that tubing is not against the skin by using gauze and kerlix. Dietary:  Important dietary reminders:  1. Increase Protein intake (i.e. Lean meats, fish, eggs, legumes, and yogurt)  2. No added salt  3. If diabetic, follow a diabetic diet and check glucose prior to meals or as instructed by your physician. Dietary Supplements:  [] Huber  [] 30ml ProStat  [] EnsureEnlive [] Ensure Max/Premier  [] Other:    If you are still having pain after you go home:   For wounds on lower legs or arms, elevate the affected limb.  Use over-the-counter medications you would normally use for pain as permitted by your primary care doctor.  For persistent pain not relieved by the above interventions, please call your primary care doctor. Return Appointment:   Return Appointment: With Dr. Mateo Love  in  [address] [place] [address])    [] Return Appointment for a Wound Assessment (Nurse Visit) on:       [x] Orders placed during your visit:   Orders Placed This Encounter   Procedures    Initiate Outpatient Wound Care Protocol       o All other future appointments:  - No future appointments.       Your nurse  is:  Claudine Kumar     Electronically signed by Rina Rowland RN on 1/31/2022 at 10:39 AM     Wound Care Center Information: Should you experience any significant changes in your wound(s) or have questions about your wound care, please contact the [address] [place] [address] at [phone]. We are open from 8:00am - 4:30p Monday thru Friday except for Wednesdays which we are closed. Please give us 24-48 hours to return your call. Call your doctor now or seek immediate medical care if:    · You have symptoms of infection, such as:  ? Increased pain, swelling, warmth, or redness. ? Red streaks leading from the area. ? Pus draining from the area. ? A fever.         Physician for this visit and orders: Milana Fuentes MD    [] Patient unable to sign Discharge Instructions given to ECF/Transportation/POA        Electronically signed by Milana Fuentes MD on 1/31/2022 at 11:02 AM

## 2022-02-07 ENCOUNTER — HOSPITAL ENCOUNTER (OUTPATIENT)
Dept: WOUND CARE | Age: 72
Discharge: HOME | End: 2022-02-07
Payer: MEDICARE

## 2022-02-07 VITALS
WEIGHT: 211.2 LBS | BODY MASS INDEX: 41.25 KG/M2 | HEART RATE: 98 BPM | SYSTOLIC BLOOD PRESSURE: 151 MMHG | RESPIRATION RATE: 20 BRPM | DIASTOLIC BLOOD PRESSURE: 92 MMHG | TEMPERATURE: 95.8 F

## 2022-02-07 DIAGNOSIS — T81.89XD NONHEALING SURGICAL WOUND, SUBSEQUENT ENCOUNTER: ICD-10-CM

## 2022-02-07 DIAGNOSIS — T81.89XA NON-HEALING SURGICAL WOUND, INITIAL ENCOUNTER: Primary | ICD-10-CM

## 2022-02-07 PROCEDURE — 11042 DBRDMT SUBQ TIS 1ST 20SQCM/<: CPT | Performed by: SPECIALIST

## 2022-02-07 PROCEDURE — 11042 DBRDMT SUBQ TIS 1ST 20SQCM/<: CPT

## 2022-02-07 PROCEDURE — 97607 NEG PRS WND THR NDME<=50SQCM: CPT

## 2022-02-07 RX ORDER — LIDOCAINE HYDROCHLORIDE 40 MG/ML
SOLUTION TOPICAL ONCE
Status: CANCELLED | OUTPATIENT
Start: 2022-02-07 | End: 2022-02-07

## 2022-02-07 RX ORDER — LIDOCAINE 40 MG/G
CREAM TOPICAL ONCE
Status: CANCELLED | OUTPATIENT
Start: 2022-02-07 | End: 2022-02-07

## 2022-02-07 RX ORDER — BACITRACIN, NEOMYCIN, POLYMYXIN B 400; 3.5; 5 [USP'U]/G; MG/G; [USP'U]/G
OINTMENT TOPICAL ONCE
Status: CANCELLED | OUTPATIENT
Start: 2022-02-07 | End: 2022-02-07

## 2022-02-07 RX ORDER — CLOBETASOL PROPIONATE 0.5 MG/G
OINTMENT TOPICAL ONCE
Status: CANCELLED | OUTPATIENT
Start: 2022-02-07 | End: 2022-02-07

## 2022-02-07 RX ORDER — LIDOCAINE 50 MG/G
OINTMENT TOPICAL ONCE
Status: CANCELLED | OUTPATIENT
Start: 2022-02-07 | End: 2022-02-07

## 2022-02-07 RX ORDER — LIDOCAINE HYDROCHLORIDE 20 MG/ML
JELLY TOPICAL ONCE
Status: CANCELLED | OUTPATIENT
Start: 2022-02-07 | End: 2022-02-07

## 2022-02-07 RX ORDER — BACITRACIN ZINC AND POLYMYXIN B SULFATE 500; 1000 [USP'U]/G; [USP'U]/G
OINTMENT TOPICAL ONCE
Status: CANCELLED | OUTPATIENT
Start: 2022-02-07 | End: 2022-02-07

## 2022-02-07 RX ORDER — BETAMETHASONE DIPROPIONATE 0.05 %
OINTMENT (GRAM) TOPICAL ONCE
Status: CANCELLED | OUTPATIENT
Start: 2022-02-07 | End: 2022-02-07

## 2022-02-07 RX ORDER — GINSENG 100 MG
CAPSULE ORAL ONCE
Status: CANCELLED | OUTPATIENT
Start: 2022-02-07 | End: 2022-02-07

## 2022-02-07 RX ORDER — GENTAMICIN SULFATE 1 MG/G
OINTMENT TOPICAL ONCE
Status: CANCELLED | OUTPATIENT
Start: 2022-02-07 | End: 2022-02-07

## 2022-02-07 ASSESSMENT — PAIN DESCRIPTION - PROGRESSION: CLINICAL_PROGRESSION: NOT CHANGED

## 2022-02-07 NOTE — PROGRESS NOTES
[facility]  Progress Note and Procedure Note      Benedict Espinoza  MEDICAL RECORD NUMBER:  [id number]  AGE: 73 y.o. GENDER: female  : 1950  EPISODE DATE:  2022    Subjective:     No chief complaint on file. HISTORY of PRESENT ILLNESS HPI     Benedict Espinoza is a 73 y.o. female who presents today for wound/ulcer evaluation. History of Wound Context: This patient was referred from Dr. Kenna Ly office for a nonhealing total knee incision having been performed in November of this year.  Apparently for some time the patient has noticed portion of the distal wound remained open with occasional drainage.  Currently she has been seen several times by the physicians assistant orthopedic office and she d been placed on Keflex with really no improvement in the appearance of the wound.  States she is otherwise recuperating from surgery well continues with rehab on her knee.  Patient is now on appropriate antibiotics for recent tissue culture done here in wound care.  Also patient recently seen by Dr. Romina Alex postop evaluation. Patient been wearing the snap VAC without difficulty until the last 48 hours when the seal was broken he was forced to remove it  Pain Assessment:  Wound/Ulcer Pain Timing/Severity: none  Quality of pain: N/A  Severity:  0 / 10   Modifying Factors: None  Associated Signs/Symptoms: none    Ulcer Identification:  Ulcer Type: non-healing surgical  Contributing Factors: obesity    Objective:      BP (!) 151/92   Pulse 98   Temp 95.8 °F (35.4 °C) (Temporal)   Resp 20   Wt 211 lb 3.2 oz (95.8 kg)   BMI 41.25 kg/m²     Wt Readings from Last 3 Encounters:   22 211 lb 3.2 oz (95.8 kg)   01/10/22 210 lb 8.6 oz (95.5 kg)       PHYSICAL EXAM    Extremities: Full-thickness wound containing fibrin and granulation tissue distal portion of recent total knee incision        Assessment:      No diagnosis found.      Procedure Note  Indications:  Based on my examination of this patient's wound(s) today, sharp excision is required to promote healing and evaluate the extent healing. Performed by: Frederic Barba MD    Consent obtained? Yes    Time out taken: Yes    Pain Control: Anesthetic: 4% Lidocaine Liquid Topical     Debridement:Excisional Debridement    Using curette the wound was sharply debrided    down through and including the removal of  epidermis, dermis and subcutaneous tissue. Devitalized Tissue Debrided:  fibrin      Pre Debridement Measurements:  Are located in the Wound Documentation Flow Sheet   Wound #: 1     Post  Debridement Measurements:  Negative Pressure Wound Therapy Knee Anterior; Left (Active)   Wound Type Surgical 01/31/22 0952   Unit Type snap 01/24/22 1022   Number of pieces used 1 01/24/22 1022   Cycle Continuous 01/24/22 1022   Target Pressure (mmHg) 125 01/24/22 1022   Number of days: 14       Wound 01/10/22 Knee #1 LEFT KNEE (Active)   Wound Image   02/07/22 0952   Wound Etiology Non-Healing Surgical 01/10/22 1020   Wound Cleansed Cleansed with saline 01/31/22 1003   Dressing/Treatment Negative pressure wound therapy 02/07/22 1051   Wound Length (cm) 1.4 cm 02/07/22 0952   Wound Width (cm) 0.5 cm 02/07/22 0952   Wound Depth (cm) 0.5 cm 02/07/22 0952   Wound Surface Area (cm^2) 0.7 cm^2 02/07/22 0952   Change in Wound Size % (l*w) 65 02/07/22 0952   Wound Volume (cm^3) 0.35 cm^3 02/07/22 0952   Wound Healing % 71 02/07/22 0952   Post-Procedure Length (cm) 1.5 cm 02/07/22 1040   Post-Procedure Width (cm) 0.6 cm 02/07/22 1040   Post-Procedure Depth (cm) 0.7 cm 02/07/22 1040   Post-Procedure Surface Area (cm^2) 0.9 cm^2 02/07/22 1040   Post-Procedure Volume (cm^3) 0.63 cm^3 02/07/22 1040   Distance Tunneling (cm) 0 cm 02/07/22 0952   Tunneling Position ___ O'Clock 0 02/07/22 0952   Undermining Starts ___ O'Clock 2:00 01/31/22 1003   Undermining Ends___ O'Clock 3:00 01/31/22 1003   Undermining Maxium Distance (cm) 0 02/07/22 0952   Wound Assessment Slough;Pink/red 02/07/22 0952   Drainage Amount Small 02/07/22 0952   Drainage Description Serosanguinous 02/07/22 0952   Odor None 02/07/22 0952   Roselyn-wound Assessment Edematous 02/07/22 0952   Margins Defined edges 02/07/22 0952   Wound Thickness Description not for Pressure Injury Full thickness 02/07/22 0952   Number of days: 28          Percent of Wound Debrided: 100%    Total Surface Area Debrided:  .9 sq cm    Diabetic/Pressure/Non Pressure Ulcers only:  Ulcer: Non-Pressure ulcer, fat layer exposed    Bleeding: Minimal    Hemostasis Achieved: by pressure    Procedural Pain: 0  / 10     Post Procedural Pain: 0 / 10     Response to treatment:  Well tolerated by patient. Now appears to have healthy granulation tissue        Plan:     Treatment Note: Please see attached Discharge Instructions. These instructions were given and signed by the patient or POA    New Medication(s) at this visit:   New Prescriptions    No medications on file       Other orders at this visit: No orders of the defined types were placed in this encounter. Discharge [address] [facility] Physician Orders and Discharge Instructions  [address] [place] [address]. [place] [address]  Telephone: [phone]  NAME:  Elaine Montemayor  YOB: 1950  MEDICAL RECORD NUMBER:  [id number]  DATE:  2/7/2022    Wash hands with soap and water prior to and after every dressing change. Wound Cleansing:   · Do not scrub or use excessive force. · With each dressing change, rinse wounds with 0.9% Saline. (May use wound wash or soft contact solution. Both can be purchased at a local drug store). · If unable to obtain saline, may use a gentle soap and water. · Keep wounds dry in the shower unless otherwise instructed by the physician. · For wounds on lower legs, cast covers can be purchased at local drug stores, so that you may shower and keep the wound(s) dry.     [] Vashe Wash solution instructions (if prescribed): Apply enough Vashe to soak a piece of gauze and place on wound bed for 5-10 minutes. DO NOT rinse after Vashe has been applied. Follow dressing application as instructed below. Roselyn wound Topical Treatments:  Do not apply lotions, creams, or ointments to the skin around the wound bed unless directed as followed:     [] Apply around the wound: [] moisturizing lotion [] Antifungal ointment [] No-Sting barrier film [] Zinc paste [] Other:    Negative Pressure:           Wound Location:  SNAP VAC  [x] Pressure @125mmHg  continuous using black foam.    []White foam to be used on:     []Change dressing: []Monday, Wednesday Friday  []Tuesday, Thursday, Saturday   [x] Other: Friday nurse visit 02/11/22   Make sure that tubing is not against the skin by using gauze and kerlix. Edema Control:     [x] Elevate leg(s) above the level of the heart for 30 minutes 4-5 times a day and/or when sitting. [x] Avoid prolonged standing in one place. Dietary:  Important dietary reminders:  1. Increase Protein intake (i.e. Lean meats, fish, eggs, legumes, and yogurt)  2. No added salt  3. If diabetic, follow a diabetic diet and check glucose prior to meals or as instructed by your physician. Dietary Supplements:  [] Huber  [] 30ml ProStat  [] EnsureEnlive [] Ensure Max/Premier  [] Other:    If you are still having pain after you go home:   For wounds on lower legs or arms, elevate the affected limb.  Use over-the-counter medications you would normally use for pain as permitted by your primary care doctor.  For persistent pain not relieved by the above interventions, please call your primary care doctor.      Return Appointment:  Johnny Gil Return Appointment: With Dr. Neil Lebron on Thursday 02/24/22    [] Return Appointment for a Wound Assessment (Nurse Visit) on:       [x] Orders placed during your visit: No orders of the defined types were placed in this encounter. o All other future appointments:  Future Appointments   Date Time Provider Shreya Raymundoi   2/11/2022 12:45 PM SCHEDULE, [facility] WOUND NURSE VISIT [facility] WOUND [facility]   -       Your nurse  is:  Lissy Cobian     Electronically signed by Marixa Polo RN on 2/7/2022 at 10:43 AM     [address] [facility] [address] Information: Should you experience any significant changes in your wound(s) or have questions about your wound care, please contact the [address] [place] [address] at [phone]. We are open from 8:00am - 4:30p Monday thru Friday except for Wednesdays which we are closed. Please give us 24-48 hours to return your call. Call your doctor now or seek immediate medical care if:    · You have symptoms of infection, such as:  ? Increased pain, swelling, warmth, or redness. ? Red streaks leading from the area. ? Pus draining from the area. ? A fever.         Physician for this visit and orders: Tameka Moreira MD    [] Patient unable to sign Discharge Instructions given to ECF/Transportation/POA        Electronically signed by Tameka Moreira MD on 2/7/2022 at 12:03 PM

## 2022-02-07 NOTE — PROGRESS NOTES
SNap Vac Dressing Application    NAME:  Madhuri Welch  YOB: 1950  MEDICAL RECORD NUMBER:  [id number]  DATE:  2/7/2022    1. Rinsed wound with saline  2. Cut foam to fit  3. Applied ring of hydrocolloid around wound approx 1\" from edge of wound  4. Cut hydrocolloid dressing kit to accommodate wound  5. Attach dressing kit over wound  6. Cut tubing to length for proper fitting for patient  7. Cannister primed   8.  Patient instructed on care of cannister and how to re-prime if neccessary

## 2022-02-11 ENCOUNTER — HOSPITAL ENCOUNTER (OUTPATIENT)
Dept: WOUND CARE | Age: 72
Discharge: HOME | End: 2022-02-11
Payer: MEDICARE

## 2022-02-11 PROCEDURE — 97607 NEG PRS WND THR NDME<=50SQCM: CPT

## 2022-02-24 ENCOUNTER — HOSPITAL ENCOUNTER (OUTPATIENT)
Dept: WOUND CARE | Age: 72
Discharge: HOME | End: 2022-02-24
Payer: MEDICARE

## 2022-02-24 VITALS
DIASTOLIC BLOOD PRESSURE: 79 MMHG | TEMPERATURE: 96 F | SYSTOLIC BLOOD PRESSURE: 172 MMHG | RESPIRATION RATE: 16 BRPM | HEART RATE: 90 BPM

## 2022-02-24 DIAGNOSIS — T81.89XD NON-HEALING SURGICAL WOUND, SUBSEQUENT ENCOUNTER: ICD-10-CM

## 2022-02-24 PROCEDURE — 99212 OFFICE O/P EST SF 10 MIN: CPT | Performed by: SPECIALIST

## 2022-02-24 PROCEDURE — 99213 OFFICE O/P EST LOW 20 MIN: CPT

## 2022-02-24 NOTE — PROGRESS NOTES
[facility]  Progress Note and Procedure Note      Adan Esqueda  AGE: 73 y.o. GENDER: female  : 1950  EPISODE DATE:  2022      Subjective:     Chief Complaint   Patient presents with    Wound Check     left knee         HISTORY of PRESENT ILLNESS HPI     Adan Esqueda is a 73 y.o. female who presents today for wound evaluation. History of Wound Context: Patient continues follow-up for her nonhealing wound following total knee incision. She states she is doing well tolerated the snap VAC this past week without difficulty  Wound Pain Timing/Severity: none  Quality of pain: N/A  Severity:  0 / 10   Modifying Factors: None  Associated Signs/Symptoms: none    Wound Identification:  Wound Type: non-healing surgical  Contributing Factors: obesity        PAST MEDICAL HISTORY        Diagnosis Date    Arthritis     Cancer ([address]     BASAL CELL ON NOSE AND CHEEK, LEFT SCAPULA    Hyperlipidemia     Hypertension        PAST SURGICAL HISTORY    Past Surgical History:   Procedure Laterality Date    CHOLECYSTECTOMY      HYSTERECTOMY      JOINT REPLACEMENT      LEFT KNEE    PARATHYROIDECTOMY         FAMILY HISTORY    History reviewed. No pertinent family history.     SOCIAL HISTORY    Social History     Tobacco Use    Smoking status: Never Smoker    Smokeless tobacco: Never Used   Vaping Use    Vaping Use: Never used   Substance Use Topics    Alcohol use: Yes     Comment: 1-2 TIMES A YEAR;     Drug use: Never       ALLERGIES    Allergies   Allergen Reactions    Latex Rash     Okay to have flu shot       Nickel      Rash      Sulfa Antibiotics      Rash      Other Rash     Wool allergy causes rash       MEDICATIONS    Current Outpatient Medications on File Prior to Encounter   Medication Sig Dispense Refill    ascorbic acid (VITAMIN C) 500 MG tablet Take 500 mg by mouth daily 1000 mg      atorvastatin (LIPITOR) 20 MG tablet Take 20 mg by mouth daily      benzonatate (TESSALON) 100 MG capsule Take 1 capsule by mouth as needed      calcium carbonate (OYSTER SHELL CALCIUM 500 MG) 1250 (500 Ca) MG tablet Take 1 tablet by mouth daily      cetirizine (ZYRTEC) 10 MG tablet Take 10 mg by mouth daily      gabapentin (NEURONTIN) 300 MG capsule Take 1 capsule by mouth every evening.  glucosamine-chondroitin 500-400 MG CAPS Take 1 capsule by mouth daily      losartan (COZAAR) 100 MG tablet Take 1 tablet by mouth daily      mometasone (ELOCON) 0.1 % ointment Apply 1 Dose topically as needed      sertraline (ZOLOFT) 100 MG tablet Take 1 tablet by mouth nightly      tolterodine (DETROL LA) 4 MG extended release capsule Take 1 capsule by mouth daily      MAGNESIUM ASPARTATE PO Take 1 tablet by mouth daily      VITAMIN D, ERGOCALCIFEROL, PO Take 5,000 Units by mouth daily      triamcinolone (ARISTOCORT) 0.5 % cream Apply 1 Dose topically as needed       No current facility-administered medications on file prior to encounter. REVIEW OF SYSTEMS    Pertinent items are noted in HPI. Objective:      BP (!) 172/79   Pulse 90   Temp 96 °F (35.6 °C) (Temporal)   Resp 16     PHYSICAL EXAM    Extremities: no cyanosis, clubbing or edema and small superficial granulating wound with marked epithelialization distal portion of left total knee surgical incision. See new wound measurements below        Assessment:     1.  Non-healing surgical wound, subsequent encounter          Wound Measurements:  Wound 01/10/22 Knee #1 LEFT KNEE (Active)   Wound Image   02/07/22 0952   Wound Etiology Non-Healing Surgical 01/10/22 1020   Wound Cleansed Cleansed with saline 02/24/22 1034   Dressing/Treatment Negative pressure wound therapy 02/11/22 1304   Wound Length (cm) 0.6 cm 02/24/22 1034   Wound Width (cm) 0.3 cm 02/24/22 1034   Wound Depth (cm) 0.2 cm 02/24/22 1034   Wound Surface Area (cm^2) 0.18 cm^2 02/24/22 1034   Change in Wound Size % (l*w) 91 02/24/22 1034   Wound Volume (cm^3) 0.036 cm^3 02/24/22 1034   Wound Healing % 97 02/24/22 1034   Post-Procedure Length (cm) 0.6 cm 02/24/22 1121   Post-Procedure Width (cm) 0.3 cm 02/24/22 1121   Post-Procedure Depth (cm) 0.2 cm 02/24/22 1121   Post-Procedure Surface Area (cm^2) 0.18 cm^2 02/24/22 1121   Post-Procedure Volume (cm^3) 0.036 cm^3 02/24/22 1121   Distance Tunneling (cm) 0 cm 02/24/22 1034   Tunneling Position ___ O'Clock 0 02/11/22 1304   Undermining Starts ___ O'Clock 0 02/11/22 1304   Undermining Ends___ O'Clock 0 02/11/22 1304   Undermining Maxium Distance (cm) 0 02/24/22 1034   Wound Assessment Fibrin 02/24/22 1034   Drainage Amount Small 02/24/22 1034   Drainage Description Brown 02/24/22 1034   Odor None 02/24/22 1034   Roselyn-wound Assessment Edematous 02/24/22 1034   Margins Defined edges 02/24/22 1034   Wound Thickness Description not for Pressure Injury Full thickness 02/24/22 1034   Number of days: 45          Plan:     Treatment Note please see attached Discharge Instructions    New Medication(s) at this visit:   New Prescriptions    No medications on file       Other orders at this visit: No orders of the defined types were placed in this encounter. Written patient dismissal instructions given to patient and signed by patient or POA. Discharge [facility] Physician Orders and Discharge Instructions  [address] [place] [address]. [address] [place] [address]  Telephone: [phone]  NAME:  Alphonse Ramos OF BIRTH:  1950  MEDICAL RECORD NUMBER:  [id number]  DATE:  2/24/2022    Wash hands with soap and water prior to and after every dressing change. Wound Cleansing:   · Do not scrub or use excessive force. · With each dressing change, rinse wounds with 0.9% Saline. (May use wound wash or soft contact solution. Both can be purchased at a local drug store). · If unable to obtain saline, may use a gentle soap and water.   · Keep wounds dry in the shower unless otherwise instructed by the physician. · For wounds on lower legs, cast covers can be purchased at local drug stores, so that you may shower and keep the wound(s) dry. []  Vashe Wash solution instructions (if prescribed): Apply enough Vashe to soak a piece of gauze and place on wound bed for 5-10 minutes. DO NOT rinse after Vashe has been applied. Follow dressing application as instructed below. Roselyn wound Topical Treatments:  Do not apply lotions, creams, or ointments to the skin around the wound bed unless directed as followed:     [] Apply around the wound: [] moisturizing lotion [] Antifungal ointment [] No-Sting barrier film [] Zinc paste [] Other:       Dressings:           Wound Location: left knee      Apply Primary Dressing to wound:       Collagen - lightly moisten with normal saline     Cover and Secure with:     Cover and Secure with: Other bandaid, or silicone border   Avoid contact of tape with skin if possible.  When to change Dressing: [] Daily [] Every Other Day [] Once a week  [] Three times per week: [] Monday, Wednesday, Friday [x] Tuesday, Thursday, Saturday  [] Do Not Change Dressing [] Other:    Dietary:  Important dietary reminders:  1. Increase Protein intake (i.e. Lean meats, fish, eggs, legumes, and yogurt)  2. No added salt  3. If diabetic, follow a diabetic diet and check glucose prior to meals or as instructed by your physician. Dietary Supplements:  [] Huber  [] 30ml ProStat  [] EnsureEnlive [] Ensure Max/Premier  [] Other:    If you are still having pain after you go home:   For wounds on lower legs or arms, elevate the affected limb.  Use over-the-counter medications you would normally use for pain as permitted by your primary care doctor.  For persistent pain not relieved by the above interventions, please call your primary care doctor.      Return Appointment:   Return Appointment: With Dr. Tiney Kayser  in  [address] [place] [address])    [] Return Appointment for a Wound Assessment (Nurse Visit) on:       [x] Orders placed during your visit: No orders of the defined types were placed in this encounter. o All other future appointments:  - No future appointments. Your nurse  is:  Anup Elder     Electronically signed by Yaneth Pennington RN on 2/24/2022 at [facility]: Should you experience any significant changes in your wound(s) or have questions about your wound care, please contact the [address] [place] [address] at [phone]. We are open from 8:00am - 4:30p Monday thru Friday except for Wednesdays which we are closed. Please give us 24-48 hours to return your call. Call your doctor now or seek immediate medical care if:    · You have symptoms of infection, such as:  ? Increased pain, swelling, warmth, or redness. ? Red streaks leading from the area. ? Pus draining from the area. ? A fever.         Physician for this visit and orders: Frederic Barba MD    [] Patient unable to sign Discharge Instructions given to ECF/Transportation/POA            Electronically signed by Frederic Barba MD on 2/24/2022 at 12:11 PM

## 2022-03-03 ENCOUNTER — HOSPITAL ENCOUNTER (OUTPATIENT)
Dept: WOUND CARE | Age: 72
Discharge: HOME | End: 2022-03-03
Payer: MEDICARE

## 2022-03-03 VITALS
RESPIRATION RATE: 16 BRPM | BODY MASS INDEX: 40.47 KG/M2 | DIASTOLIC BLOOD PRESSURE: 87 MMHG | SYSTOLIC BLOOD PRESSURE: 141 MMHG | HEART RATE: 89 BPM | TEMPERATURE: 96.2 F | WEIGHT: 207.23 LBS

## 2022-03-03 DIAGNOSIS — T81.89XD NON-HEALING SURGICAL WOUND, SUBSEQUENT ENCOUNTER: Primary | ICD-10-CM

## 2022-03-03 DIAGNOSIS — T81.89XA NON-HEALING SURGICAL WOUND, INITIAL ENCOUNTER: ICD-10-CM

## 2022-03-03 PROCEDURE — 99212 OFFICE O/P EST SF 10 MIN: CPT | Performed by: SPECIALIST

## 2022-03-03 PROCEDURE — 99212 OFFICE O/P EST SF 10 MIN: CPT

## 2022-03-03 PROCEDURE — 6370000000 HC RX 637 (ALT 250 FOR IP): Performed by: SPECIALIST

## 2022-03-03 RX ORDER — BETAMETHASONE DIPROPIONATE 0.05 %
OINTMENT (GRAM) TOPICAL ONCE
Status: CANCELLED | OUTPATIENT
Start: 2022-03-03 | End: 2022-03-03

## 2022-03-03 RX ORDER — LIDOCAINE 50 MG/G
OINTMENT TOPICAL ONCE
Status: CANCELLED | OUTPATIENT
Start: 2022-03-03 | End: 2022-03-03

## 2022-03-03 RX ORDER — LIDOCAINE HYDROCHLORIDE 40 MG/ML
SOLUTION TOPICAL ONCE
Status: CANCELLED | OUTPATIENT
Start: 2022-03-03 | End: 2022-03-03

## 2022-03-03 RX ORDER — LIDOCAINE HYDROCHLORIDE 20 MG/ML
JELLY TOPICAL ONCE
Status: CANCELLED | OUTPATIENT
Start: 2022-03-03 | End: 2022-03-03

## 2022-03-03 RX ORDER — CLOBETASOL PROPIONATE 0.5 MG/G
OINTMENT TOPICAL ONCE
Status: CANCELLED | OUTPATIENT
Start: 2022-03-03 | End: 2022-03-03

## 2022-03-03 RX ORDER — LIDOCAINE 40 MG/G
CREAM TOPICAL ONCE
Status: CANCELLED | OUTPATIENT
Start: 2022-03-03 | End: 2022-03-03

## 2022-03-03 RX ORDER — GINSENG 100 MG
CAPSULE ORAL ONCE
Status: CANCELLED | OUTPATIENT
Start: 2022-03-03 | End: 2022-03-03

## 2022-03-03 RX ORDER — BACITRACIN ZINC AND POLYMYXIN B SULFATE 500; 1000 [USP'U]/G; [USP'U]/G
OINTMENT TOPICAL ONCE
Status: CANCELLED | OUTPATIENT
Start: 2022-03-03 | End: 2022-03-03

## 2022-03-03 RX ORDER — LIDOCAINE HYDROCHLORIDE 40 MG/ML
SOLUTION TOPICAL ONCE
Status: COMPLETED | OUTPATIENT
Start: 2022-03-03 | End: 2022-03-03

## 2022-03-03 RX ORDER — GENTAMICIN SULFATE 1 MG/G
OINTMENT TOPICAL ONCE
Status: CANCELLED | OUTPATIENT
Start: 2022-03-03 | End: 2022-03-03

## 2022-03-03 RX ORDER — BACITRACIN, NEOMYCIN, POLYMYXIN B 400; 3.5; 5 [USP'U]/G; MG/G; [USP'U]/G
OINTMENT TOPICAL ONCE
Status: CANCELLED | OUTPATIENT
Start: 2022-03-03 | End: 2022-03-03

## 2022-03-03 RX ADMIN — LIDOCAINE HYDROCHLORIDE: 40 SOLUTION TOPICAL at 10:06

## 2022-03-03 NOTE — PROGRESS NOTES
[facility]  Progress Note and Procedure Note      Tamela Waldron  AGE: 73 y.o. GENDER: female  : 1950  EPISODE DATE:  3/3/2022      Subjective:     Chief Complaint   Patient presents with    Wound Check     left lower leg         HISTORY of PRESENT ILLNESS HPI     Tamela Waldron is a 73 y.o. female who presents today for wound evaluation. History of Wound Context: Patient continues follow-up for her nonhealing wound following total knee incision. Wound Pain Timing/Severity: none  Quality of pain: N/A  Severity:  0 / 10   Modifying Factors: None  Associated Signs/Symptoms: none    Wound Identification:  Wound Type: non-healing surgical  Contributing Factors: obesity        PAST MEDICAL HISTORY        Diagnosis Date    Arthritis     Cancer ([address]     BASAL CELL ON NOSE AND CHEEK, LEFT SCAPULA    Hyperlipidemia     Hypertension        PAST SURGICAL HISTORY    Past Surgical History:   Procedure Laterality Date    CHOLECYSTECTOMY      HYSTERECTOMY      JOINT REPLACEMENT      LEFT KNEE    PARATHYROIDECTOMY         FAMILY HISTORY    No family history on file.     SOCIAL HISTORY    Social History     Tobacco Use    Smoking status: Never Smoker    Smokeless tobacco: Never Used   Vaping Use    Vaping Use: Never used   Substance Use Topics    Alcohol use: Yes     Comment: 1-2 TIMES A YEAR;     Drug use: Never       ALLERGIES    Allergies   Allergen Reactions    Latex Rash     Okay to have flu shot       Nickel      Rash      Sulfa Antibiotics      Rash      Other Rash     Wool allergy causes rash       MEDICATIONS    Current Outpatient Medications on File Prior to Encounter   Medication Sig Dispense Refill    ascorbic acid (VITAMIN C) 500 MG tablet Take 500 mg by mouth daily 1000 mg      atorvastatin (LIPITOR) 20 MG tablet Take 20 mg by mouth daily      calcium carbonate (OYSTER SHELL CALCIUM 500 MG) 1250 (500 Ca) MG tablet Take 1 tablet by mouth daily      cetirizine (ZYRTEC) 10 MG tablet Take 10 mg by mouth daily      gabapentin (NEURONTIN) 300 MG capsule Take 1 capsule by mouth every evening.  glucosamine-chondroitin 500-400 MG CAPS Take 1 capsule by mouth daily      losartan (COZAAR) 100 MG tablet Take 1 tablet by mouth daily      sertraline (ZOLOFT) 100 MG tablet Take 1 tablet by mouth nightly      tolterodine (DETROL LA) 4 MG extended release capsule Take 1 capsule by mouth daily      MAGNESIUM ASPARTATE PO Take 1 tablet by mouth daily      VITAMIN D, ERGOCALCIFEROL, PO Take 5,000 Units by mouth daily      benzonatate (TESSALON) 100 MG capsule Take 1 capsule by mouth as needed      mometasone (ELOCON) 0.1 % ointment Apply 1 Dose topically as needed      triamcinolone (ARISTOCORT) 0.5 % cream Apply 1 Dose topically as needed       No current facility-administered medications on file prior to encounter. REVIEW OF SYSTEMS    Pertinent items are noted in HPI. Objective:      BP (!) 141/87   Pulse 89   Temp 96.2 °F (35.7 °C) (Temporal)   Resp 16   Wt 207 lb 3.7 oz (94 kg)   BMI 40.47 kg/m²     PHYSICAL EXAM    Extremities: Complete epithelialization of her knee wound        Assessment:     1. Non-healing surgical wound, subsequent encounter    2. Non-healing surgical wound, initial encounter          Wound Measurements:          Plan:     Treatment Note please see attached Discharge Instructions    New Medication(s) at this visit:   New Prescriptions    No medications on file       Other orders at this visit:   Orders Placed This Encounter   Procedures    Initiate Outpatient Wound Care Protocol       Written patient dismissal instructions given to patient and signed by patient or POA. Discharge Instructions       [address]  Physician [facility] [address] [place] [address] [address]  Telephone: [phone]    NAME:  Rudy Miller  DATE OF BIRTH: